# Patient Record
Sex: FEMALE | Race: OTHER | HISPANIC OR LATINO
[De-identification: names, ages, dates, MRNs, and addresses within clinical notes are randomized per-mention and may not be internally consistent; named-entity substitution may affect disease eponyms.]

---

## 2020-06-22 ENCOUNTER — ASOB RESULT (OUTPATIENT)
Age: 35
End: 2020-06-22

## 2020-06-22 ENCOUNTER — APPOINTMENT (OUTPATIENT)
Dept: ANTEPARTUM | Facility: CLINIC | Age: 35
End: 2020-06-22
Payer: MEDICAID

## 2020-06-22 PROCEDURE — 76801 OB US < 14 WKS SINGLE FETUS: CPT | Mod: 59

## 2020-08-17 ENCOUNTER — ASOB RESULT (OUTPATIENT)
Age: 35
End: 2020-08-17

## 2020-08-17 ENCOUNTER — APPOINTMENT (OUTPATIENT)
Dept: ANTEPARTUM | Facility: CLINIC | Age: 35
End: 2020-08-17
Payer: MEDICAID

## 2020-08-17 PROCEDURE — 76811 OB US DETAILED SNGL FETUS: CPT | Mod: 59

## 2020-11-16 ENCOUNTER — ASOB RESULT (OUTPATIENT)
Age: 35
End: 2020-11-16

## 2020-11-16 ENCOUNTER — APPOINTMENT (OUTPATIENT)
Dept: ANTEPARTUM | Facility: CLINIC | Age: 35
End: 2020-11-16
Payer: MEDICAID

## 2020-11-16 PROBLEM — Z00.00 ENCOUNTER FOR PREVENTIVE HEALTH EXAMINATION: Status: ACTIVE | Noted: 2020-11-16

## 2020-11-16 PROCEDURE — 76816 OB US FOLLOW-UP PER FETUS: CPT

## 2020-11-16 PROCEDURE — 76819 FETAL BIOPHYS PROFIL W/O NST: CPT

## 2020-12-03 ENCOUNTER — EMERGENCY (EMERGENCY)
Facility: HOSPITAL | Age: 35
LOS: 1 days | Discharge: HOME | End: 2020-12-03
Attending: EMERGENCY MEDICINE
Payer: COMMERCIAL

## 2020-12-03 VITALS
HEIGHT: 65 IN | RESPIRATION RATE: 18 BRPM | SYSTOLIC BLOOD PRESSURE: 122 MMHG | WEIGHT: 205.03 LBS | OXYGEN SATURATION: 99 % | TEMPERATURE: 98 F | HEART RATE: 82 BPM | DIASTOLIC BLOOD PRESSURE: 80 MMHG

## 2020-12-03 LAB
APPEARANCE UR: CLEAR — SIGNIFICANT CHANGE UP
BACTERIA # UR AUTO: ABNORMAL
BILIRUB UR-MCNC: NEGATIVE — SIGNIFICANT CHANGE UP
COLOR SPEC: YELLOW — SIGNIFICANT CHANGE UP
DIFF PNL FLD: NEGATIVE — SIGNIFICANT CHANGE UP
EPI CELLS # UR: SIGNIFICANT CHANGE UP
GLUCOSE UR QL: NEGATIVE MG/DL — SIGNIFICANT CHANGE UP
HCG UR QL: POSITIVE
KETONES UR-MCNC: ABNORMAL
LEUKOCYTE ESTERASE UR-ACNC: ABNORMAL
NITRITE UR-MCNC: NEGATIVE — SIGNIFICANT CHANGE UP
PH UR: 6 — SIGNIFICANT CHANGE UP (ref 5–8)
PROT UR-MCNC: 30 MG/DL
RAPID RVP RESULT: SIGNIFICANT CHANGE UP
RBC CASTS # UR COMP ASSIST: SIGNIFICANT CHANGE UP /HPF (ref 0–4)
RV+EV RNA SPEC QL NAA+PROBE: DETECTED
S PYO DNA THROAT QL NAA+PROBE: SIGNIFICANT CHANGE UP
SARS-COV-2 RNA SPEC QL NAA+PROBE: SIGNIFICANT CHANGE UP
SP GR SPEC: 1.02 — SIGNIFICANT CHANGE UP (ref 1.01–1.02)
UROBILINOGEN FLD QL: NEGATIVE MG/DL — SIGNIFICANT CHANGE UP
WBC UR QL: SIGNIFICANT CHANGE UP

## 2020-12-03 PROCEDURE — 87651 STREP A DNA AMP PROBE: CPT

## 2020-12-03 PROCEDURE — 99283 EMERGENCY DEPT VISIT LOW MDM: CPT

## 2020-12-03 PROCEDURE — 81025 URINE PREGNANCY TEST: CPT

## 2020-12-03 PROCEDURE — 81001 URINALYSIS AUTO W/SCOPE: CPT

## 2020-12-03 PROCEDURE — 87798 DETECT AGENT NOS DNA AMP: CPT

## 2020-12-03 PROCEDURE — 87086 URINE CULTURE/COLONY COUNT: CPT

## 2020-12-03 PROCEDURE — 99284 EMERGENCY DEPT VISIT MOD MDM: CPT

## 2020-12-03 PROCEDURE — 0225U NFCT DS DNA&RNA 21 SARSCOV2: CPT

## 2020-12-03 RX ORDER — ACETAMINOPHEN 500 MG
975 TABLET ORAL ONCE
Refills: 0 | Status: DISCONTINUED | OUTPATIENT
Start: 2020-12-03 | End: 2020-12-08

## 2020-12-03 NOTE — ED PROVIDER NOTE - NS ED ROS FT
No fever/+chills, No photophobia/eye pain/changes in vision, +nasal congestion, No ear pain/+sore throat/dysphagia, No chest pain/palpitations, no SOB/cough/wheeze/stridor, No abdominal pain, No N/V/D, no dysuria/frequency/discharge, No neck/back pain, no rash, no changes in neurological status/function.

## 2020-12-03 NOTE — ED ADULT TRIAGE NOTE - CHIEF COMPLAINT QUOTE
Patient arrived to ED today with c/o runny nose, sore throat, pain to throat, headaches for the past 4 days.  Patient reports she has pelvic pain, vaginal pain also.  Patient was told to come to L&D first, was seen in L&D.  While in L&D her OBGYN Dr. Monroe ([facility]) was consulted and patient was cleared to come over to ED for evaluation as per patient.

## 2020-12-03 NOTE — ED PROVIDER NOTE - OBJECTIVE STATEMENT
This is a 35 year old female with c/o headache, congestion, and body aches and chills x 1 day.  She was seen at [facility] clinic and US done by GYN MD Velazquez, which was unremarkable.  She notes pelvic pressure.  She denies any vaginal bleeding or abdominal pain, fevers, n/v/d or any sick contacts, recent travel or rashes.

## 2020-12-03 NOTE — ED PROVIDER NOTE - PHYSICAL EXAMINATION
Constitutional - well-developed; well nourished. Head - NCAT. Nasal cavities +congested, Airway patent. Oropharynx +slight erythema, Eyes - PERRL. CV - RRR. no murmur. no edema. Pulm - CTAB. Abd - soft, nt. no rebound. no guarding. Neuro - A&Ox3. strength 5/5 x4. sensation intact x4. normal gait. Skin - No rash. MSK - normal ROM.

## 2020-12-03 NOTE — ED PROVIDER NOTE - ATTENDING CONTRIBUTION TO CARE
35yoF;  @35weeks; now p/w cough, congestion, bodyaches and headache x1 day. patient also with bilateral lower abd pain, intermittently x2-3 days. denies vaginal bleeding. denies leakage of fluid. reports she can feel baby moving.  General:     NAD, well-nourished, well-appearing  Head:     NC/AT, EOMI, oral mucosa moist  Neck:     trachea midline  Lungs:   diminished BS @ right base, clear @ left lung  CVS:     S1S2, RRR, no m/g/r  Abd:     +BS, s/nt/nd, no organomegaly  Ext:    2+ radial and pedal pulses, no c/c/e  Neuro: AAOx3, no sensory/motor deficits  A/P:  35yoF p/w URI sx  -covid swab, will tx with abx for presumed pna  -us fetus

## 2020-12-03 NOTE — ED PROVIDER NOTE - PATIENT PORTAL LINK FT
You can access the FollowMyHealth Patient Portal offered by [facility] by registering at the following website: [email or web address]. By joining [employer]’s FollowMyHealth portal, you will also be able to view your health information using other applications (apps) compatible with our system.

## 2020-12-03 NOTE — ED PROVIDER NOTE - PROGRESS NOTE DETAILS
spoke to GYN resident cleared gynecologically patient crying, concerned about pregnancy, wants US done in ED patient covid negative +rhinovirus and strep negative, spoke to L&D to assess if MD Velazquez wants fetal monitoring

## 2020-12-04 LAB
CULTURE RESULTS: SIGNIFICANT CHANGE UP
SPECIMEN SOURCE: SIGNIFICANT CHANGE UP

## 2020-12-04 RX ORDER — AZITHROMYCIN 500 MG/1
1 TABLET, FILM COATED ORAL
Qty: 3 | Refills: 0
Start: 2020-12-04 | End: 2020-12-06

## 2020-12-23 ENCOUNTER — APPOINTMENT (OUTPATIENT)
Dept: ANTEPARTUM | Facility: CLINIC | Age: 35
End: 2020-12-23
Payer: MEDICAID

## 2020-12-23 ENCOUNTER — ASOB RESULT (OUTPATIENT)
Age: 35
End: 2020-12-23

## 2020-12-23 ENCOUNTER — INPATIENT (INPATIENT)
Facility: HOSPITAL | Age: 35
LOS: 1 days | Discharge: HOME | End: 2020-12-25
Attending: OBSTETRICS & GYNECOLOGY | Admitting: OBSTETRICS & GYNECOLOGY
Payer: COMMERCIAL

## 2020-12-23 VITALS — DIASTOLIC BLOOD PRESSURE: 80 MMHG | HEART RATE: 78 BPM | SYSTOLIC BLOOD PRESSURE: 133 MMHG

## 2020-12-23 DIAGNOSIS — O26.893 OTHER SPECIFIED PREGNANCY RELATED CONDITIONS, THIRD TRIMESTER: ICD-10-CM

## 2020-12-23 LAB
ABO RH CONFIRMATION: SIGNIFICANT CHANGE UP
BASOPHILS # BLD AUTO: 0.05 K/UL — SIGNIFICANT CHANGE UP (ref 0–0.2)
BASOPHILS NFR BLD AUTO: 0.6 % — SIGNIFICANT CHANGE UP (ref 0–2)
BLD GP AB SCN SERPL QL: SIGNIFICANT CHANGE UP
EOSINOPHIL # BLD AUTO: 0.17 K/UL — SIGNIFICANT CHANGE UP (ref 0–0.5)
EOSINOPHIL NFR BLD AUTO: 1.9 % — SIGNIFICANT CHANGE UP (ref 0–6)
HCT VFR BLD CALC: 40.7 % — SIGNIFICANT CHANGE UP (ref 34.5–45)
HGB BLD-MCNC: 13.7 G/DL — SIGNIFICANT CHANGE UP (ref 11.5–15.5)
HIV 1 & 2 AB SERPL IA.RAPID: SIGNIFICANT CHANGE UP
IMM GRANULOCYTES NFR BLD AUTO: 0.3 % — SIGNIFICANT CHANGE UP (ref 0–1.5)
LYMPHOCYTES # BLD AUTO: 1.89 K/UL — SIGNIFICANT CHANGE UP (ref 1–3.3)
LYMPHOCYTES # BLD AUTO: 20.8 % — SIGNIFICANT CHANGE UP (ref 13–44)
MCHC RBC-ENTMCNC: 28.8 PG — SIGNIFICANT CHANGE UP (ref 27–34)
MCHC RBC-ENTMCNC: 33.7 GM/DL — SIGNIFICANT CHANGE UP (ref 32–36)
MCV RBC AUTO: 85.7 FL — SIGNIFICANT CHANGE UP (ref 80–100)
MONOCYTES # BLD AUTO: 0.59 K/UL — SIGNIFICANT CHANGE UP (ref 0–0.9)
MONOCYTES NFR BLD AUTO: 6.5 % — SIGNIFICANT CHANGE UP (ref 2–14)
NEUTROPHILS # BLD AUTO: 6.34 K/UL — SIGNIFICANT CHANGE UP (ref 1.8–7.4)
NEUTROPHILS NFR BLD AUTO: 69.9 % — SIGNIFICANT CHANGE UP (ref 43–77)
PLATELET # BLD AUTO: 303 K/UL — SIGNIFICANT CHANGE UP (ref 150–400)
RBC # BLD: 4.75 M/UL — SIGNIFICANT CHANGE UP (ref 3.8–5.2)
RBC # FLD: 13.2 % — SIGNIFICANT CHANGE UP (ref 10.3–14.5)
SARS-COV-2 RNA SPEC QL NAA+PROBE: SIGNIFICANT CHANGE UP
WBC # BLD: 9.07 K/UL — SIGNIFICANT CHANGE UP (ref 3.8–10.5)
WBC # FLD AUTO: 9.07 K/UL — SIGNIFICANT CHANGE UP (ref 3.8–10.5)

## 2020-12-23 PROCEDURE — 99072 ADDL SUPL MATRL&STAF TM PHE: CPT

## 2020-12-23 PROCEDURE — 76821 MIDDLE CEREBRAL ARTERY ECHO: CPT | Mod: 59

## 2020-12-23 PROCEDURE — 76820 UMBILICAL ARTERY ECHO: CPT

## 2020-12-23 PROCEDURE — 76819 FETAL BIOPHYS PROFIL W/O NST: CPT

## 2020-12-23 PROCEDURE — 76816 OB US FOLLOW-UP PER FETUS: CPT

## 2020-12-23 PROCEDURE — 93976 VASCULAR STUDY: CPT

## 2020-12-23 RX ORDER — KETOROLAC TROMETHAMINE 30 MG/ML
30 SYRINGE (ML) INJECTION EVERY 6 HOURS
Refills: 0 | Status: DISCONTINUED | OUTPATIENT
Start: 2020-12-23 | End: 2020-12-24

## 2020-12-23 RX ORDER — SODIUM CHLORIDE 9 MG/ML
1000 INJECTION, SOLUTION INTRAVENOUS
Refills: 0 | Status: DISCONTINUED | OUTPATIENT
Start: 2020-12-23 | End: 2020-12-23

## 2020-12-23 RX ORDER — SODIUM CHLORIDE 9 MG/ML
500 INJECTION, SOLUTION INTRAVENOUS ONCE
Refills: 0 | Status: DISCONTINUED | OUTPATIENT
Start: 2020-12-23 | End: 2020-12-25

## 2020-12-23 RX ORDER — IBUPROFEN 200 MG
600 TABLET ORAL EVERY 6 HOURS
Refills: 0 | Status: COMPLETED | OUTPATIENT
Start: 2020-12-23 | End: 2021-11-21

## 2020-12-23 RX ORDER — OXYTOCIN 10 UNIT/ML
333.33 VIAL (ML) INJECTION
Qty: 20 | Refills: 0 | Status: DISCONTINUED | OUTPATIENT
Start: 2020-12-23 | End: 2020-12-25

## 2020-12-23 RX ORDER — OXYTOCIN 10 UNIT/ML
333.33 VIAL (ML) INJECTION
Qty: 20 | Refills: 0 | Status: COMPLETED | OUTPATIENT
Start: 2020-12-23 | End: 2020-12-23

## 2020-12-23 RX ORDER — SIMETHICONE 80 MG/1
80 TABLET, CHEWABLE ORAL EVERY 4 HOURS
Refills: 0 | Status: DISCONTINUED | OUTPATIENT
Start: 2020-12-23 | End: 2020-12-25

## 2020-12-23 RX ORDER — NALOXONE HYDROCHLORIDE 4 MG/.1ML
0.1 SPRAY NASAL
Refills: 0 | Status: DISCONTINUED | OUTPATIENT
Start: 2020-12-24 | End: 2020-12-25

## 2020-12-23 RX ORDER — ENOXAPARIN SODIUM 100 MG/ML
40 INJECTION SUBCUTANEOUS EVERY 24 HOURS
Refills: 0 | Status: DISCONTINUED | OUTPATIENT
Start: 2020-12-24 | End: 2020-12-25

## 2020-12-23 RX ORDER — KETOROLAC TROMETHAMINE 30 MG/ML
30 SYRINGE (ML) INJECTION ONCE
Refills: 0 | Status: DISCONTINUED | OUTPATIENT
Start: 2020-12-24 | End: 2020-12-25

## 2020-12-23 RX ORDER — METOCLOPRAMIDE HCL 10 MG
10 TABLET ORAL ONCE
Refills: 0 | Status: DISCONTINUED | OUTPATIENT
Start: 2020-12-23 | End: 2020-12-23

## 2020-12-23 RX ORDER — ONDANSETRON 8 MG/1
4 TABLET, FILM COATED ORAL EVERY 6 HOURS
Refills: 0 | Status: DISCONTINUED | OUTPATIENT
Start: 2020-12-24 | End: 2020-12-25

## 2020-12-23 RX ORDER — MAGNESIUM HYDROXIDE 400 MG/1
30 TABLET, CHEWABLE ORAL
Refills: 0 | Status: DISCONTINUED | OUTPATIENT
Start: 2020-12-23 | End: 2020-12-25

## 2020-12-23 RX ORDER — OXYCODONE HYDROCHLORIDE 5 MG/1
5 TABLET ORAL ONCE
Refills: 0 | Status: DISCONTINUED | OUTPATIENT
Start: 2020-12-23 | End: 2020-12-25

## 2020-12-23 RX ORDER — ACETAMINOPHEN 500 MG
975 TABLET ORAL
Refills: 0 | Status: DISCONTINUED | OUTPATIENT
Start: 2020-12-23 | End: 2020-12-25

## 2020-12-23 RX ORDER — DIPHENHYDRAMINE HCL 50 MG
25 CAPSULE ORAL ONCE
Refills: 0 | Status: DISCONTINUED | OUTPATIENT
Start: 2020-12-24 | End: 2020-12-25

## 2020-12-23 RX ORDER — CEFAZOLIN SODIUM 1 G
2000 VIAL (EA) INJECTION ONCE
Refills: 0 | Status: COMPLETED | OUTPATIENT
Start: 2020-12-23 | End: 2020-12-23

## 2020-12-23 RX ORDER — CITRIC ACID/SODIUM CITRATE 300-500 MG
30 SOLUTION, ORAL ORAL ONCE
Refills: 0 | Status: COMPLETED | OUTPATIENT
Start: 2020-12-23 | End: 2020-12-23

## 2020-12-23 RX ORDER — SODIUM CHLORIDE 9 MG/ML
1000 INJECTION, SOLUTION INTRAVENOUS
Refills: 0 | Status: DISCONTINUED | OUTPATIENT
Start: 2020-12-23 | End: 2020-12-25

## 2020-12-23 RX ORDER — ACETAMINOPHEN 500 MG
1000 TABLET ORAL ONCE
Refills: 0 | Status: COMPLETED | OUTPATIENT
Start: 2020-12-23 | End: 2020-12-23

## 2020-12-23 RX ORDER — KETOROLAC TROMETHAMINE 30 MG/ML
30 SYRINGE (ML) INJECTION EVERY 6 HOURS
Refills: 0 | Status: DISCONTINUED | OUTPATIENT
Start: 2020-12-24 | End: 2020-12-25

## 2020-12-23 RX ORDER — ALBUTEROL 90 UG/1
0 AEROSOL, METERED ORAL
Qty: 0 | Refills: 0 | DISCHARGE

## 2020-12-23 RX ORDER — DIPHENHYDRAMINE HCL 50 MG
25 CAPSULE ORAL EVERY 6 HOURS
Refills: 0 | Status: DISCONTINUED | OUTPATIENT
Start: 2020-12-23 | End: 2020-12-25

## 2020-12-23 RX ORDER — OXYCODONE HYDROCHLORIDE 5 MG/1
5 TABLET ORAL
Refills: 0 | Status: DISCONTINUED | OUTPATIENT
Start: 2020-12-23 | End: 2020-12-25

## 2020-12-23 RX ORDER — SODIUM CHLORIDE 9 MG/ML
1000 INJECTION, SOLUTION INTRAVENOUS ONCE
Refills: 0 | Status: COMPLETED | OUTPATIENT
Start: 2020-12-23 | End: 2020-12-23

## 2020-12-23 RX ORDER — LANOLIN
1 OINTMENT (GRAM) TOPICAL EVERY 6 HOURS
Refills: 0 | Status: DISCONTINUED | OUTPATIENT
Start: 2020-12-23 | End: 2020-12-25

## 2020-12-23 RX ORDER — FAMOTIDINE 10 MG/ML
20 INJECTION INTRAVENOUS ONCE
Refills: 0 | Status: COMPLETED | OUTPATIENT
Start: 2020-12-23 | End: 2020-12-23

## 2020-12-23 RX ORDER — ONDANSETRON 8 MG/1
4 TABLET, FILM COATED ORAL ONCE
Refills: 0 | Status: DISCONTINUED | OUTPATIENT
Start: 2020-12-24 | End: 2020-12-25

## 2020-12-23 RX ORDER — TETANUS TOXOID, REDUCED DIPHTHERIA TOXOID AND ACELLULAR PERTUSSIS VACCINE, ADSORBED 5; 2.5; 8; 8; 2.5 [IU]/.5ML; [IU]/.5ML; UG/.5ML; UG/.5ML; UG/.5ML
0.5 SUSPENSION INTRAMUSCULAR ONCE
Refills: 0 | Status: DISCONTINUED | OUTPATIENT
Start: 2020-12-23 | End: 2020-12-25

## 2020-12-23 RX ORDER — SODIUM CHLORIDE 9 MG/ML
500 INJECTION, SOLUTION INTRAVENOUS ONCE
Refills: 0 | Status: COMPLETED | OUTPATIENT
Start: 2020-12-23 | End: 2020-12-23

## 2020-12-23 RX ADMIN — Medication 100 MILLIGRAM(S): at 16:07

## 2020-12-23 RX ADMIN — Medication 1000 MILLIUNIT(S)/MIN: at 19:52

## 2020-12-23 RX ADMIN — SODIUM CHLORIDE 1000 MILLILITER(S): 9 INJECTION, SOLUTION INTRAVENOUS at 19:15

## 2020-12-23 RX ADMIN — SODIUM CHLORIDE 2000 MILLILITER(S): 9 INJECTION, SOLUTION INTRAVENOUS at 15:40

## 2020-12-23 RX ADMIN — Medication 30 MILLIGRAM(S): at 19:20

## 2020-12-23 RX ADMIN — Medication 400 MILLIGRAM(S): at 19:45

## 2020-12-23 RX ADMIN — Medication 1000 MILLIUNIT(S)/MIN: at 17:48

## 2020-12-23 RX ADMIN — Medication 30 MILLILITER(S): at 15:52

## 2020-12-23 RX ADMIN — Medication 30 MILLIGRAM(S): at 23:41

## 2020-12-23 RX ADMIN — Medication 1000 MILLIGRAM(S): at 20:20

## 2020-12-23 RX ADMIN — FAMOTIDINE 20 MILLIGRAM(S): 10 INJECTION INTRAVENOUS at 15:52

## 2020-12-23 RX ADMIN — SODIUM CHLORIDE 125 MILLILITER(S): 9 INJECTION, SOLUTION INTRAVENOUS at 23:41

## 2020-12-23 RX ADMIN — Medication 30 MILLIGRAM(S): at 18:21

## 2020-12-23 NOTE — OB PROVIDER H&P - ASSESSMENT
90 day supply sent to [facility] mail order pharmacy.   The pt is a 36 y/o  at 37w2d who presents from [facility] office for delivery due to oligohydramnios and abnormal umbilical dopplers  - Admit to L&D  - Consent  - Admission Labs  - IV fluids   - Continuous toco and fetal monitoring   - DVT prophylaxis  - pre-op abx    Dr. Johansen aware The pt is a 34 y/o  at 37w2d who presents from [facility] office for delivery due to oligohydramnios and abnormal umbilical dopplers  - Admit to L&D  - Consent  - Admission Labs  - IV fluids   - Continuous toco and fetal monitoring   - DVT prophylaxis  - pre-op abx    Addendum:  patient with oligo and elevated umbilical dopplers sent in from [facility] office  patient with previous c/section and uninducible cervix  will set up for repeat c/section

## 2020-12-23 NOTE — OB RN PATIENT PROFILE - PMH
Mild intermittent asthma, unspecified whether complicated  PRN albuterol, last needed yesterday  Miscarriage  2013  Vaginal birth after  ()

## 2020-12-23 NOTE — OB RN DELIVERY SUMMARY - NS_SEPSISRSKCALC_OBGYN_ALL_OB_FT
EOS calculated successfully. EOS Risk Factor: 0.5/1000 live births ([facility] national incidence); GA=37w2d; Temp=97.7; ROM=0.017; GBS='Unknown'; Antibiotics='No antibiotics or any antibiotics < 2 hrs prior to birth'

## 2020-12-23 NOTE — OB NEONATOLOGY/PEDIATRICIAN DELIVERY SUMMARY - NSPEDSNEONOTESA_OBGYN_ALL_OB_FT
Called by Dr. Johansen to this RCS at 37.2 weeks due to new finding of oligohydramnios and elevated dopplers. Mother is a  female with h/o C/S for stillborn infant with brain tumor in ,previous , followed closely this pregnancy due to previous stillborn. Maternal labs include Blood Type  ____  , HIV ___ , RPR_____ , Hep B[ +/- ], GBS  ___ , rest is unremarkable. Maternal history is significant for ____________  . Pregnancy was complicated by  _____________  .   ROM at  ______ , approximately  _______ hrs.  Resuscitation included: ___________ .  Apgars were: _______ . EOS score _______. Admit to __________ .  Temperature prior to transfer ______ C. Called by Dr. Johansen to this RCS at 37.2 weeks due to new finding of oligohydramnios and elevated dopplers. Mother is a  female with h/o C/S for stillborn infant with brain tumor in 2006,previous , followed closely this pregnancy due to previous stillborn. Maternal labs include Blood Type O pos , HIV neg, RPR  NR, Hep B  -, GBS  Unk, COVID 19 neg, rest is unremarkable.   L&D:  AROM at delivery  Resuscitation included: WDS .  Apgars were: 9&9. . BW: 2130g   Admit to NICU secondary to LBW.  Temperature prior to transfer 37.2 C.

## 2020-12-23 NOTE — OB PROVIDER H&P - NSHPPHYSICALEXAM_GEN_ALL_CORE
Vital Signs Last 24 Hrs  HR: 78 (23 Dec 2020 12:08) (78 - 78)  BP: 133/80 (23 Dec 2020 12:08) (133/80 - 133/80)    FHT: 140 baseline, moderate variability, +accels, intermittent variable decels  Kaleva: no contractions  SVE: .5/0/-3

## 2020-12-23 NOTE — OB PROVIDER H&P - HISTORY OF PRESENT ILLNESS
The pt is a 36 y/o  at 37w2d who presents from [facility] office for delivery due to oligohydramnios and abnormal umbilical dopplers. The pt denies any vaginal bleeding, LOF, contractions and reports good fetal movement.    EDC: 2021  Pregnancy course:   - Oligohydramnios MAIRA : 3.9  - Elevated umbilical artery doppler     POBHx:   G1- - PT pCS at 32 weeks due to brain mass (demise)  G2- - FT  at 40 weeks  G3- SAB   PMHx: Asthma  PSHx: D&C, C/Sx1, Varicose vein repair Rt and Lft LE, nasal skin bx  Meds: Albuterol (last used yesterday)  Allergies: NKDA  Social: denies toxic habits x3

## 2020-12-23 NOTE — OB PROVIDER DELIVERY SUMMARY - NSPROVIDERDELIVERYNOTE_OBGYN_ALL_OB_FT
now  s/p uncomplicated rCS at 37w2d due to oligohydramnios and elevated umbilical artery dopplers  Findings: male infant, cephalic presentation, APGARs 9/9, weight [id number]. Dense adhesions from anterior abdominal wall to anterior surface of uterus. Surgicel x2 placed for hemostasis. Otherwise grossly normal ovaries and fallopian tubes bilaterally.   Uterus closed in 2 layers. Muscle, fascia and skin reapproximated.

## 2020-12-23 NOTE — OB PROVIDER H&P - ATTENDING COMMENTS
patient with previous c.section and oligo  will proceed with repeat c/section  risks, benefits and alternatives discussed

## 2020-12-24 ENCOUNTER — TRANSCRIPTION ENCOUNTER (OUTPATIENT)
Age: 35
End: 2020-12-24

## 2020-12-24 LAB
BASOPHILS # BLD AUTO: 0.02 K/UL — SIGNIFICANT CHANGE UP (ref 0–0.2)
BASOPHILS NFR BLD AUTO: 0.2 % — SIGNIFICANT CHANGE UP (ref 0–2)
EOSINOPHIL # BLD AUTO: 0.03 K/UL — SIGNIFICANT CHANGE UP (ref 0–0.5)
EOSINOPHIL NFR BLD AUTO: 0.3 % — SIGNIFICANT CHANGE UP (ref 0–6)
HCT VFR BLD CALC: 31.3 % — LOW (ref 34.5–45)
HGB BLD-MCNC: 10.5 G/DL — LOW (ref 11.5–15.5)
HIV 1+2 AB+HIV1 P24 AG SERPL QL IA: SIGNIFICANT CHANGE UP
IMM GRANULOCYTES NFR BLD AUTO: 0.5 % — SIGNIFICANT CHANGE UP (ref 0–1.5)
LYMPHOCYTES # BLD AUTO: 2.07 K/UL — SIGNIFICANT CHANGE UP (ref 1–3.3)
LYMPHOCYTES # BLD AUTO: 20.5 % — SIGNIFICANT CHANGE UP (ref 13–44)
MCHC RBC-ENTMCNC: 28.5 PG — SIGNIFICANT CHANGE UP (ref 27–34)
MCHC RBC-ENTMCNC: 33.5 GM/DL — SIGNIFICANT CHANGE UP (ref 32–36)
MCV RBC AUTO: 85.1 FL — SIGNIFICANT CHANGE UP (ref 80–100)
MONOCYTES # BLD AUTO: 0.65 K/UL — SIGNIFICANT CHANGE UP (ref 0–0.9)
MONOCYTES NFR BLD AUTO: 6.4 % — SIGNIFICANT CHANGE UP (ref 2–14)
NEUTROPHILS # BLD AUTO: 7.27 K/UL — SIGNIFICANT CHANGE UP (ref 1.8–7.4)
NEUTROPHILS NFR BLD AUTO: 72.1 % — SIGNIFICANT CHANGE UP (ref 43–77)
PLATELET # BLD AUTO: 245 K/UL — SIGNIFICANT CHANGE UP (ref 150–400)
RBC # BLD: 3.68 M/UL — LOW (ref 3.8–5.2)
RBC # FLD: 13.2 % — SIGNIFICANT CHANGE UP (ref 10.3–14.5)
T PALLIDUM AB TITR SER: NEGATIVE — SIGNIFICANT CHANGE UP
WBC # BLD: 10.09 K/UL — SIGNIFICANT CHANGE UP (ref 3.8–10.5)
WBC # FLD AUTO: 10.09 K/UL — SIGNIFICANT CHANGE UP (ref 3.8–10.5)

## 2020-12-24 RX ORDER — IBUPROFEN 200 MG
1 TABLET ORAL
Qty: 28 | Refills: 0
Start: 2020-12-24 | End: 2020-12-30

## 2020-12-24 RX ORDER — IBUPROFEN 200 MG
600 TABLET ORAL EVERY 6 HOURS
Refills: 0 | Status: DISCONTINUED | OUTPATIENT
Start: 2020-12-24 | End: 2020-12-25

## 2020-12-24 RX ORDER — ACETAMINOPHEN 500 MG
3 TABLET ORAL
Qty: 84 | Refills: 0
Start: 2020-12-24 | End: 2020-12-30

## 2020-12-24 RX ORDER — OXYCODONE HYDROCHLORIDE 5 MG/1
1 TABLET ORAL
Qty: 16 | Refills: 0
Start: 2020-12-24 | End: 2020-12-27

## 2020-12-24 RX ADMIN — Medication 975 MILLIGRAM(S): at 21:55

## 2020-12-24 RX ADMIN — Medication 975 MILLIGRAM(S): at 03:25

## 2020-12-24 RX ADMIN — Medication 975 MILLIGRAM(S): at 15:16

## 2020-12-24 RX ADMIN — Medication 30 MILLIGRAM(S): at 00:26

## 2020-12-24 RX ADMIN — Medication 975 MILLIGRAM(S): at 04:10

## 2020-12-24 RX ADMIN — Medication 975 MILLIGRAM(S): at 08:55

## 2020-12-24 RX ADMIN — Medication 975 MILLIGRAM(S): at 09:45

## 2020-12-24 RX ADMIN — Medication 600 MILLIGRAM(S): at 18:11

## 2020-12-24 RX ADMIN — Medication 30 MILLIGRAM(S): at 06:36

## 2020-12-24 RX ADMIN — ENOXAPARIN SODIUM 40 MILLIGRAM(S): 100 INJECTION SUBCUTANEOUS at 03:25

## 2020-12-24 RX ADMIN — Medication 30 MILLIGRAM(S): at 12:50

## 2020-12-24 RX ADMIN — Medication 975 MILLIGRAM(S): at 22:25

## 2020-12-24 RX ADMIN — Medication 600 MILLIGRAM(S): at 18:44

## 2020-12-24 RX ADMIN — Medication 30 MILLIGRAM(S): at 13:05

## 2020-12-24 RX ADMIN — Medication 975 MILLIGRAM(S): at 15:59

## 2020-12-24 RX ADMIN — Medication 30 MILLIGRAM(S): at 06:21

## 2020-12-24 NOTE — DISCHARGE NOTE OB - MEDICATION SUMMARY - MEDICATIONS TO STOP TAKING
I will STOP taking the medications listed below when I get home from the hospital:    Azithromycin 3 Day Dose Pack 500 mg oral tablet  -- 1 tab(s) by mouth once a day   -- Do not take dairy products, antacids, or iron preparations within one hour of this medication.  Finish all this medication unless otherwise directed by prescriber.

## 2020-12-24 NOTE — PROGRESS NOTE ADULT - ASSESSMENT
A/P:  34yo  now POD#1 s/p repeat  section at 37 weeks 2 day gestation.  -Vital Signs Stable  -Awaiting passive trial of void, tolerating PO, bowel function nml   -Advance care as tolerated   -Continue routine postpartum/postoperative care and education.  -Ambulation, DVT prophylaxis lovenox  -Healthy male infant, declines circumcision.

## 2020-12-24 NOTE — DISCHARGE NOTE OB - PATIENT PORTAL LINK FT
You can access the FollowMyHealth Patient Portal offered by [facility] by registering at the following website: [email or web address]. By joining [employer]’s FollowMyHealth portal, you will also be able to view your health information using other applications (apps) compatible with our system.

## 2020-12-24 NOTE — DISCHARGE NOTE OB - CARE PLAN
Principal Discharge DX:	 delivery delivered  Goal:	Rapid recovery  Assessment and plan of treatment:	Patient should transition to regular activity level. Resume regular diet. Please call your provider to schedule post operative visit in 1 week. Take medications as directed. Exclusive breast feeding for the first 6 months is recommended. Nothing per vagina for 6 weeks (incl. sex, douching, etc).  If you have additional concerns, develop a fever of 100.4 or greater, or uncontrolled vaginal bleeding, please inform your provider.

## 2020-12-24 NOTE — DISCHARGE NOTE OB - CARE PROVIDER_API CALL
Gisell Rondon)  Obstetrics and Gynecology  [address] [place] [address]  Phone: [phone]  Fax: [phone]  Follow Up Time:

## 2020-12-24 NOTE — PROGRESS NOTE ADULT - SUBJECTIVE AND OBJECTIVE BOX
ABBE HERRMANN is a 36yo  now POD#1 s/p repeat  section at 37 weeks 2 day gestation.    S:    The patient has no complaints. Pain controlled with medication   She is ambulating without difficulty and tolerating PO.   + flatus/-BM/- voiding   Patient denies headache, chest pain, SOB, and leg pain.     O:    T(C): 37.1 (- @ 04:30), Max: 37.1 (-20 @ 04:30)  HR: 68 (20 @ 04:30) (61 - 78)  BP: 123/73 (--20 @ 04:30) (97/57 - 140/82)  RR: 16 (- @ 04:30) (13 - 19)  SpO2: 98% (20 @ 04:30) (97% - 99%)      Gen: NAD, AOx3  CV: RRR  Pulm: CTAB  Breast: nontender, not engorged   Abdomen:  soft, non-tender, non-distended, +bowel sounds.  Uterus:  Fundus firm below umbilicus  Incision:  Clean/intact with steri-strips in place, wet serosanguinous discharge noted  VE:  +lochia  Ext:  Non-tender, no edema                           13.7   9.07  )-----------( 303      ( 23 Dec 2020 14:50 )             40.7

## 2020-12-24 NOTE — DISCHARGE NOTE OB - MEDICATION SUMMARY - MEDICATIONS TO TAKE
I will START or STAY ON the medications listed below when I get home from the hospital:    acetaminophen 325 mg oral tablet  -- 3 tab(s) by mouth every 6 hours   -- Indication: For pain    ibuprofen 600 mg oral tablet  -- 1 tab(s) by mouth every 6 hours, As needed, Mild Pain (1 - 3)  -- Indication: For pain    oxyCODONE 5 mg oral tablet  -- 1 tab(s) by mouth every 6 hours MDD:4  -- Caution federal law prohibits the transfer of this drug to any person other  than the person for whom it was prescribed.  It is very important that you take or use this exactly as directed.  Do not skip doses or discontinue unless directed by your doctor.  May cause drowsiness or dizziness.  This prescription cannot be refilled.  Using more of this medication than prescribed may cause serious breathing problems.    -- Indication: For severe pain    albuterol  -- Indication: For asthma

## 2020-12-24 NOTE — DISCHARGE NOTE OB - PLAN OF CARE
Patient should transition to regular activity level. Resume regular diet. Please call your provider to schedule post operative visit in 1 week. Take medications as directed. Exclusive breast feeding for the first 6 months is recommended. Nothing per vagina for 6 weeks (incl. sex, douching, etc).  If you have additional concerns, develop a fever of 100.4 or greater, or uncontrolled vaginal bleeding, please inform your provider. Rapid recovery

## 2020-12-24 NOTE — DISCHARGE NOTE OB - MATERIALS PROVIDED
[facility] [place] Screening Program/Breastfeeding Log/Breastfeeding Mother’s Support Group Information/Guide to Postpartum Care/[facility] Hearing Screen Program/Back To Sleep Handout/Shaken Baby Prevention Handout/Breastfeeding Guide and Packet/Birth Certificate Instructions/Tdap Vaccination (VIS Pub Date: 2012)

## 2020-12-24 NOTE — PROGRESS NOTE ADULT - ATTENDING COMMENTS
patient is POD#1 status post c/section  patient doing well  breastfeeding encouraged  will consider discharge home  baby out of NICU with mom

## 2020-12-24 NOTE — DISCHARGE NOTE OB - HOSPITAL COURSE
Pt is a 34 yo  s/p  section. She was transferred to postpartum unit without complications during her stay. Upon discharge she is voiding, tolerating PO, ambulating, and pain is well controlled.

## 2020-12-25 ENCOUNTER — RESULT REVIEW (OUTPATIENT)
Age: 35
End: 2020-12-25

## 2020-12-25 VITALS
RESPIRATION RATE: 18 BRPM | SYSTOLIC BLOOD PRESSURE: 109 MMHG | OXYGEN SATURATION: 96 % | DIASTOLIC BLOOD PRESSURE: 74 MMHG | TEMPERATURE: 98 F | HEART RATE: 79 BPM

## 2020-12-25 LAB
SARS-COV-2 IGG SERPL QL IA: NEGATIVE — SIGNIFICANT CHANGE UP
SARS-COV-2 IGM SERPL IA-ACNC: 0.12 INDEX — SIGNIFICANT CHANGE UP
T GONDII IGG SER QL: 40.6 IU/ML — HIGH
T GONDII IGG SER QL: POSITIVE
T GONDII IGM SER QL: <3 AU/ML — SIGNIFICANT CHANGE UP
T GONDII IGM SER QL: NEGATIVE — SIGNIFICANT CHANGE UP

## 2020-12-25 PROCEDURE — 86900 BLOOD TYPING SEROLOGIC ABO: CPT

## 2020-12-25 PROCEDURE — 87389 HIV-1 AG W/HIV-1&-2 AB AG IA: CPT

## 2020-12-25 PROCEDURE — 85025 COMPLETE CBC W/AUTO DIFF WBC: CPT

## 2020-12-25 PROCEDURE — 86901 BLOOD TYPING SEROLOGIC RH(D): CPT

## 2020-12-25 PROCEDURE — 86703 HIV-1/HIV-2 1 RESULT ANTBDY: CPT

## 2020-12-25 PROCEDURE — U0003: CPT

## 2020-12-25 PROCEDURE — 86769 SARS-COV-2 COVID-19 ANTIBODY: CPT

## 2020-12-25 PROCEDURE — G0463: CPT

## 2020-12-25 PROCEDURE — 88307 TISSUE EXAM BY PATHOLOGIST: CPT | Mod: 26

## 2020-12-25 PROCEDURE — C1889: CPT

## 2020-12-25 PROCEDURE — 88307 TISSUE EXAM BY PATHOLOGIST: CPT

## 2020-12-25 PROCEDURE — 86780 TREPONEMA PALLIDUM: CPT

## 2020-12-25 PROCEDURE — 59025 FETAL NON-STRESS TEST: CPT

## 2020-12-25 PROCEDURE — 86778 TOXOPLASMA ANTIBODY IGM: CPT

## 2020-12-25 PROCEDURE — 59050 FETAL MONITOR W/REPORT: CPT

## 2020-12-25 PROCEDURE — 86850 RBC ANTIBODY SCREEN: CPT

## 2020-12-25 PROCEDURE — T1013: CPT

## 2020-12-25 PROCEDURE — 36415 COLL VENOUS BLD VENIPUNCTURE: CPT

## 2020-12-25 PROCEDURE — 86777 TOXOPLASMA ANTIBODY: CPT

## 2020-12-25 RX ADMIN — Medication 600 MILLIGRAM(S): at 11:55

## 2020-12-25 RX ADMIN — Medication 975 MILLIGRAM(S): at 09:06

## 2020-12-25 RX ADMIN — Medication 600 MILLIGRAM(S): at 05:56

## 2020-12-25 RX ADMIN — Medication 600 MILLIGRAM(S): at 00:03

## 2020-12-25 RX ADMIN — Medication 600 MILLIGRAM(S): at 00:33

## 2020-12-25 RX ADMIN — Medication 975 MILLIGRAM(S): at 09:36

## 2020-12-25 RX ADMIN — Medication 975 MILLIGRAM(S): at 03:15

## 2020-12-25 RX ADMIN — ENOXAPARIN SODIUM 40 MILLIGRAM(S): 100 INJECTION SUBCUTANEOUS at 01:21

## 2020-12-25 RX ADMIN — Medication 975 MILLIGRAM(S): at 03:45

## 2020-12-25 RX ADMIN — SIMETHICONE 80 MILLIGRAM(S): 80 TABLET, CHEWABLE ORAL at 00:22

## 2020-12-25 RX ADMIN — Medication 600 MILLIGRAM(S): at 12:25

## 2020-12-25 RX ADMIN — Medication 600 MILLIGRAM(S): at 05:26

## 2020-12-25 NOTE — PROGRESS NOTE ADULT - SUBJECTIVE AND OBJECTIVE BOX
Name: ABBE HERRMANN  MRN: [id number]  Date Admitted: 20  Location: [facility] 2E2014 ([facility] 2EST)  Attending: Arlene Johansen      Post Partum Note:     ABBE HERRMANN is a 35y  s/p repeat  section POD #2. Viable male infant at bedside.     SUBJECTIVE:  No acute events overnight. Pain is well controlled with PRN pain medication. No problems with ambulating, voiding, or PO intake. Has had flatus but no BM. Denies N/V. Patient is having normal lochia which is decreasing.    She is bottlefeeding. She reports right neck and shoulder pain, worse with movement.     OBJECTIVE:    Vital Signs Last 24 Hrs  T(C): 37 (24 Dec 2020 16:33), Max: 37 (24 Dec 2020 16:33)  T(F): 98.6 (24 Dec 2020 16:33), Max: 98.6 (24 Dec 2020 16:33)  HR: 82 (24 Dec 2020 16:33) (75 - 82)  BP: 93/58 (24 Dec 2020 16:33) (93/58 - 108/73)  RR: 18 (24 Dec 2020 16:33) (18 - 18)      Physical exam:  General: AOx3, NAD.  Heart: RRR. S1S2.  Lungs: CTABL. Good airflow bilaterally.   Abdomen: +BS, Soft, appropriately tender, nondistended, no guarding or rebound tenderness, firm uterine fundus at umbilicus, the incision is clean dry and intact with ______. No erythema or discharge.  Ext: No DVT signs, warm extremities.        LABS:                        10.5   10.09 )-----------( 245      ( 24 Dec 2020 08:09 )             31.3            Name: ABBE HERRMANN  MRN: [id number]  Date Admitted: 20  Location: [facility] 2E2014 ([facility] 2EST)  Attending: Arlene Johansen      Post Partum Note:     ABBE HERRMANN is a 35y  s/p repeat  section POD #2. Viable male infant at bedside.     SUBJECTIVE:  No acute events overnight. Pain is well controlled with PRN pain medication. No problems with ambulating, voiding, or PO intake. Has had flatus but no BM. Denies N/V. Patient is having normal lochia which is decreasing.    She is bottlefeeding. She reports right neck and shoulder pain, worse with movement.     OBJECTIVE:    Vital Signs Last 24 Hrs  T(C): 37 (24 Dec 2020 16:33), Max: 37 (24 Dec 2020 16:33)  T(F): 98.6 (24 Dec 2020 16:33), Max: 98.6 (24 Dec 2020 16:33)  HR: 82 (24 Dec 2020 16:33) (75 - 82)  BP: 93/58 (24 Dec 2020 16:33) (93/58 - 108/73)  RR: 18 (24 Dec 2020 16:33) (18 - 18)      Physical exam:  General: AOx3, NAD.  Neck: Supple, full ROM  Heart: RRR. S1S2.  Lungs: CTABL. Good airflow bilaterally.   Abdomen: +BS, Soft, appropriately tender, nondistended, no guarding or rebound tenderness, firm uterine fundus at umbilicus, the incision is clean dry and intact with ______. No erythema or discharge.  Ext: No DVT signs, warm extremities.        LABS:                        10.5   10.09 )-----------( 245      ( 24 Dec 2020 08:09 )             31.3

## 2020-12-25 NOTE — PROGRESS NOTE ADULT - ASSESSMENT
ABBE HERRMANN is a 35y  s/p repeat  section at 37w2d due to oligohydramnios and elevated dopplers. POD #2. Viable male infant at bedside.   - Pain controlled on current medications  - Tolerating po   - + flatus  - + void  - hgb  13.7  --> 10.5  - Lovenox for DVT prophylaxis   - Rh +  - Pt with male infant; declines circumcision  - Dispo: Home pending attending approval ABBE HERRMANN is a 35y  s/p repeat  section at 37w2d due to oligohydramnios and elevated dopplers. POD #2. Viable male infant at bedside.   - Pain controlled on current medications  - Tolerating po   - + flatus  - + void  - Tylenol for neck pain, likely musculoskeletal   - hgb  13.7  --> 10.5  - Lovenox for DVT prophylaxis   - Rh +  - Pt with male infant; declines circumcision  - Dispo: Home pending attending approval

## 2020-12-25 NOTE — PROGRESS NOTE ADULT - ATTENDING COMMENTS
POD#2  Doing well  incision C/D/I  meeting milestones  appt in on week at [facility]  dc home today  ppalos

## 2020-12-30 LAB — SURGICAL PATHOLOGY STUDY: SIGNIFICANT CHANGE UP
